# Patient Record
Sex: FEMALE | Race: AMERICAN INDIAN OR ALASKA NATIVE
[De-identification: names, ages, dates, MRNs, and addresses within clinical notes are randomized per-mention and may not be internally consistent; named-entity substitution may affect disease eponyms.]

---

## 2019-03-22 ENCOUNTER — HOSPITAL ENCOUNTER (OUTPATIENT)
Dept: HOSPITAL 5 - GIO | Age: 39
Discharge: HOME | End: 2019-03-22
Attending: INTERNAL MEDICINE
Payer: COMMERCIAL

## 2019-03-22 VITALS — DIASTOLIC BLOOD PRESSURE: 53 MMHG | SYSTOLIC BLOOD PRESSURE: 111 MMHG

## 2019-03-22 DIAGNOSIS — Z86.2: ICD-10-CM

## 2019-03-22 DIAGNOSIS — K21.0: ICD-10-CM

## 2019-03-22 DIAGNOSIS — Z98.891: ICD-10-CM

## 2019-03-22 DIAGNOSIS — Z79.899: ICD-10-CM

## 2019-03-22 DIAGNOSIS — K29.50: Primary | ICD-10-CM

## 2019-03-22 PROCEDURE — 88342 IMHCHEM/IMCYTCHM 1ST ANTB: CPT

## 2019-03-22 PROCEDURE — 81025 URINE PREGNANCY TEST: CPT

## 2019-03-22 PROCEDURE — 43239 EGD BIOPSY SINGLE/MULTIPLE: CPT

## 2019-03-22 PROCEDURE — 88305 TISSUE EXAM BY PATHOLOGIST: CPT

## 2019-03-22 NOTE — OPERATIVE REPORT
PROCEDURE:  EGD with biopsy.



INDICATIONS:  This is a 39-year-old -American female who has been having

some epigastric pain and discomfort, has an underlying history of anemia.  EGD

was done to make sure that there was not any associated peptic ulcer disease or

any significant upper GI pathology present.



DESCRIPTION OF PROCEDURE:  Procedure was done after getting informed consent

with MAC anesthesia.  Instrument was passed through the hypopharynx into the

esophagus, which showed mild to moderate distal erosive esophagitis.  Biopsy was

done from the distal esophagus.  Stomach showed gastritis.  Biopsy was done from

the gastric antrum, the gastric body and angular incisura to rule out for H.

pylori and atrophic gastritis.  The pylorus was patent.  The duodenum and the

bulb showed a clean based duodenal ulcer, which may have been the cause of the

patient's abdominal pain and anemia.  Second portion appeared normal.  There was

minimal bleeding from the biopsy sites.  No complications associated with the

procedure.



ASSESSMENT:  Epigastric pain, duodenal ulcer in the duodenal bulb, mild to

moderate distal erosive esophagitis, gastritis, patent pylorus.  There was

minimal bleeding associated with the procedure.  No complications associated

with the procedure.  The patient will be treated with PPI, asked to avoid

aspirin and aspirin-related products for the next few days and follow up in the

office in 1-2 weeks' time.  



RNYandy was in the room throughout the entirety of the procedure.





DD: 03/22/2019 10:16

DT: 03/22/2019 11:00

JOB# [id number]  [id number]

BONNIE/YASMINE

## 2019-03-22 NOTE — PROCEDURE NOTE
Date of procedure: 03/22/19


Pre-op diagnosis: Epigastric Pain


Post-op diagnosis: other (Duodenal Ulcer (Duodenal Bulb)/ Mild to Moderate 

Distal, Erosive Esophagitis/ Gastritis)


Procedure: 





EGD with Biopsy


Anesthesia: MAC


Surgeon: GURMEET TAMEZ


Estimated blood loss: minimal


Pathology: list


Specimen disposition: to lab


Condition: stable


Disposition: same day (Avoid aspirin and NSAID for 5 days.  Avoid tobacco and 

alcohol products.  Treat with PPI and follow up in 1 to 2 weeks ([phone]).)

## 2019-03-22 NOTE — ANESTHESIA CONSULTATION
Anesthesia Consult and Med Hx


Date of service: 03/22/19





- Airway


Anesthetic Teeth Evaluation: Good


ROM Head & Neck: Adequate


Mental/Hyoid Distance: Adequate


Intubation Access Assessment: Probably Good





- Pulmonary Exam


CTA: Yes





- Cardiac Exam


Cardiac Exam: No Murmur





- Pre-Operative Health Status


ASA Pre-Surgery Classification: ASA2


Proposed Anesthetic Plan: MAC





- Pulmonary


Hx Smoking: No


Hx Asthma: No


Hx Respiratory Symptoms: No


SOB: No


COPD: No


Home Oxygen Therapy: No


Hx Pneumonia: No


Hx Sleep Apnea: No





- Cardiovascular System


Hx Hypertension: No


Hx Coronary Artery Disease: No


Hx Heart Attack/AMI: No


Hx Angina: No


Hx Percutaneous Transluminal Coronary Angioplasty (PTCA): No


Hx Cardia Arrhythmia: No


Hx Pacemaker: No


Hx Internal Defibrillator: No


Hx Valvular Heart Disease: No


Hx Heart Murmur: Yes


Hx Peripheral Vascular Disease: No





- Central Nervous System


Hx Neuromuscular Disorder: No


Hx Seizures: No


CVA: No


Hx Back Pain: No


Hx Psychiatric Problems: No





- Gastrointestinal


Hx Ulcer: No


Hx Gastroesophageal Reflux Disease: No





- Endocrine


Hx Renal Disease: No


Hx End Stage Renal Disease: No


Hx Cirrhosis: No


Hx Liver Disease: No


Hx Insulin Dependent Diabetes: No


Hx Non-Insulin Dependent Diabetes: No


Hx Thyroid Disease: No


Hx Hypothyroidism: No





- Hematic


Hx Anemia: Yes


Hx Sickle Cell Disease: No





- Other Systems


Hx Alcohol Use: No


Hx Substance Use: No


Hx Cancer: No


Hx Obesity: No